# Patient Record
Sex: MALE | URBAN - METROPOLITAN AREA
[De-identification: names, ages, dates, MRNs, and addresses within clinical notes are randomized per-mention and may not be internally consistent; named-entity substitution may affect disease eponyms.]

---

## 2017-10-27 ENCOUNTER — OFFICE VISIT (OUTPATIENT)
Dept: FAMILY MEDICINE | Facility: CLINIC | Age: 22
End: 2017-10-27

## 2017-10-27 DIAGNOSIS — Z48.02 ENCOUNTER FOR REMOVAL OF SUTURES: Primary | ICD-10-CM

## 2017-10-27 PROCEDURE — 99207 ZZC NO CHARGE NURSE ONLY: CPT

## 2017-10-27 NOTE — PROGRESS NOTES
Vipul presents to the clinic today for  removal of sutures.  The patient has had the sutures in place for 7 days.  Currently on Keflex. Sutures placed at [facility].   There has been no history of infection or drainage.    O: Numerous sutures are seen located on the right hand, middle finger.  The wound appears to have swelling from tension.   Tetanus completed at [facility].     A:  Finger was swollen from the second knuckle to the tip, decreased sensation and cold to the touch. KL assessed and evaluated the finger. Suture are not to come out prior to day 14. Patient to clean the finger, use splint as needed, keep finger clean and dry. Patient to use hand for minimal activities.     P:  Routine wound care discussed.  The patient will follow up with a provider in 1 week to evaluate the hand. If becomes angry red, to be seen in ED for possible debreadment.     Chary Corral, RN, BSN

## 2017-10-27 NOTE — MR AVS SNAPSHOT
"              After Visit Summary   10/27/2017    Vipul Carreon    MRN: [id number]           Patient Information     Date Of Birth          1995        Visit Information        Provider Department      10/27/2017 11:00 AM RG RN [place] [facility]        Today's Diagnoses     Encounter for removal of sutures    -  1       Follow-ups after your visit        Who to contact     If you have questions or need follow up information about today's clinic visit or your schedule please contact [facility] directly at [phone].  Normal or non-critical lab and imaging results will be communicated to you by MyChart, letter or phone within 4 business days after the clinic has received the results. If you do not hear from us within 7 days, please contact the clinic through [facility] or phone. If you have a critical or abnormal lab result, we will notify you by phone as soon as possible.  Submit refill requests through [employer] or call your pharmacy and they will forward the refill request to us. Please allow 3 business days for your refill to be completed.          Additional Information About Your Visit        MyChart Information     [employer] lets you send messages to your doctor, view your test results, renew your prescriptions, schedule appointments and more. To sign up, go to [email or web address] . Click on \"Log in\" on the left side of the screen, which will take you to the Welcome page. Then click on \"Sign up Now\" on the right side of the page.     You will be asked to enter the access code listed below, as well as some personal information. Please follow the directions to create your username and password.     Your access code is: [email or web address]  Expires: 2018 11:29 AM     Your access code will  in 90 days. If you need help or a new code, please call your [facility] or [phone].        Care EveryWhere ID     This is your Care EveryWhere ID. This could be used by other organizations " to access your [facility] medical records  [id number]         Blood Pressure from Last 3 Encounters:   No data found for BP    Weight from Last 3 Encounters:   No data found for Wt              Today, you had the following     No orders found for display       Primary Care Provider    None Specified       No primary provider on file.        Equal Access to Services     DIANNE VALDEZ : Hadii aad kathy hadramano Somichali, waaxda luqadaha, qaybta kaalmada adeegyada, waxjimmie miguel eligion laurel codyjuany blair . So [facility] [phone].    ATENCIÓN: Si habla español, tiene a blount disposición servicios gratuitos de asistencia lingüística. Llame al [phone].    We comply with applicable federal civil rights laws and [place] laws. We do not discriminate on the basis of race, color, national origin, age, disability, sex, sexual orientation, or gender identity.            Thank you!     Thank you for choosing [facility]  for your care. Our goal is always to provide you with excellent care. Hearing back from our patients is one way we can continue to improve our services. Please take a few minutes to complete the written survey that you may receive in the mail after your visit with us. Thank you!             Your Updated Medication List - Protect others around you: Learn how to safely use, store and throw away your medicines at www.disposemymeds.org.      Notice  As of 10/27/2017 11:29 AM    You have not been prescribed any medications.